# Patient Record
(demographics unavailable — no encounter records)

---

## 2025-02-12 NOTE — HISTORY OF PRESENT ILLNESS
[Former] : Former [TextBox_13] : Ms. ACOSTA is a 64 year old female with a history of bronchiectasis.  Reviewed and confirmed that the patient meets screening eligibility criteria:  64 years old   Smoking Status: Former smoker  Number of pack(s) per day: 1 Number of years smoked: 40 Number of pack years smokin Quit year:   No signs/symptoms of lung cancer  [YearQuit] : 2012 [PacksperDay] : 1 [N_Years] : 40 [PacksperYear] : 40

## 2025-05-29 NOTE — DISCUSSION/SUMMARY
[de-identified] : Discussed the nature of the diagnosis and risk and benefits of different modalities of treatment. RT ECU tendinitis  RT CUtS Discussed conservative management as symptoms demand vs CSI. She will splint & take MDP  Discussed that ulnar sided wrist pain can be somewhat difficult to diagnose, that although there are no signs of abutment, that this is something that will be considered if she fails to improve Rx provided She will apply warm compresses  She will avoid prolonged flexion at the elbow  RTO 4 weeks

## 2025-05-29 NOTE — PHYSICAL EXAM
[] : positive tinel's [Right] : right wrist [FreeTextEntry3] : Ulnar sided swelling  [de-identified] : ECU, mild TFC, Mild PT, Mild first CMC, Discomfort with forced supination & pronation  [FreeTextEntry1] : Ulnar sided hand swelling  [FreeTextEntry8] : Ulnar neutral variance with osteopenia

## 2025-05-29 NOTE — HISTORY OF PRESENT ILLNESS
[5] : 5 [10] : 10 [Localized] : localized [Radiating] : radiating [Shooting] : shooting [Tingling] : tingling [Constant] : constant [Sleep] : sleep [Meds] : meds [Part time] : Work status: part time [de-identified] : 64 Year old female with 8 months ulnar sided RIGHT wrist pain.  Denies trauma.  Is followed by rheumatologist for OA and fibromyalgia.  States rheum ordered xray.  She was referred.  SHe also states if she leans on her elbow, she will get a pinching feeling and tingling ulnar wrist [] : no [FreeTextEntry1] : right wrist [FreeTextEntry3] : ~ 1 year [FreeTextEntry5] : NKI. Patient is having pain and issues with the ulnar nerve. 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

## 2025-05-29 NOTE — DATA REVIEWED
[Outside X-rays] : outside x-rays [Right] : of the right [Wrist] : wrist [Hand] : hand [I independently reviewed and interpreted images and report] : I independently reviewed and interpreted images and report [I reviewed the films/CD and agree] : I reviewed the films/CD and agree

## 2025-06-06 NOTE — DISCUSSION/SUMMARY
[Obstructive Sleep Apnea] : obstructive sleep apnea [Moderate] : moderate in severity [Alcohol Avoidance] : alcohol avoidance [Sedative Avoidance] : sedative avoidance [Weight Loss Program] : weight loss program [Sleep Study] : sleep study [Patient] : discussed with the patient [de-identified] : Reevaluate with a new home sleep study [FreeTextEntry1] : Patient is complaints of dyspnea may be more on the basis of deconditioning of the regional muscles.  No evidence of intrinsic lung disease except for minimal degree of emphysema.  Pulmonary nodules are unchanged as well as minimal bronchiectasis and minimal emphysema.  Routine chemistries will be performed and the patient has been advised to follow-up with cardiology regarding aortic insufficiency

## 2025-06-06 NOTE — END OF VISIT
[Time Spent: ___ minutes] : I have spent [unfilled] minutes of time on the encounter which excludes teaching and separately reported services. [FreeTextEntry3] : CT reviewed on PACS Sleep study reviewed with patient

## 2025-06-06 NOTE — HISTORY OF PRESENT ILLNESS
[Former] : former [>= 20 pack years] : >= 20 pack years [Obstructive Sleep Apnea] : obstructive sleep apnea [Awakes Unrefreshed] : awakes unrefreshed [Daytime Somnolence] : daytime somnolence [Home] : home [APAP:] : APAP [TextBox_4] : 64-year-old female last seen by me in December 2023 with a 30-pack-year history of cigarette smoking DC'd in 2012, bronchiectasis, pulmonary nodules, thalassemia, aortic insufficiency, chronic anemia, fibromyalgia, restless leg syndrome, moderate obstructive sleep apnea seen today in follow-up.  Patient does have continued complaints of mild shortness of breath which she is much better able to quantify.  She walks on a regular basis with her brother who has underlying cardiac disease.  They walk approximately 4.6 miles several times per week and she also notes her heart rate.  She states that on level ambulation she has no complaints of difficulty, chest pains, palpitations, lightheadedness, dizziness.  On ambulating up a hill her heart rate will go from 120 to 150 bpm at which time she will rest and quickly recover.  No history of diaphoresis.  History is negative for cough wheeze or sputum [YearQuit] : 2012 [Awakes with Dry Mouth] : does not awaken with dry mouth [Awakes with Headache] : does not awaken with headache [Snoring] : no snoring [Witnessed Apneas] : no witnessed apneas [TextBox_100] : 9/28/2023 [TextBox_108] : 21 [TextBox_112] : 2.1 minutes [TextBox_116] : 83 [TextBox_125] : 4-16 [TextBox_158] : [place] [TextBox_162] : 12/2023 [TextBox_165] : Returned her CPAP machine before her first compliance evaluation [ESS] : 0

## 2025-06-06 NOTE — CONSULT LETTER
[Dear  ___] : Dear  [unfilled], [Consult Letter:] : I had the pleasure of evaluating your patient, [unfilled]. [Please see my note below.] : Please see my note below. [Consult Closing:] : Thank you very much for allowing me to participate in the care of this patient.  If you have any questions, please do not hesitate to contact me. [Sincerely,] : Sincerely, [DrAkua  ___] : Dr. ROY [FreeTextEntry3] : Duglas Lane MD FCCP Pulmonary/Critical Care/Sleep Medicine Department of Internal Medicine  [facility]

## 2025-07-03 NOTE — PROCEDURE
[Tendon Sheath] : tendon sheath [Right] : of the right [Pain] : pain [Inflammation] : inflammation [Alcohol] : alcohol [Ethyl Chloride sprayed topically] : ethyl chloride sprayed topically [Sterile technique used] : sterile technique used [___ cc    1%] : Lidocaine ~Vcc of 1%  [___ cc    10mg] : Triamcinolone (Kenalog) ~Vcc of 10 mg  [] : Patient tolerated procedure well [Risks, benefits, alternatives discussed / Verbal consent obtained] : the risks benefits, and alternatives have been discussed, and verbal consent was obtained

## 2025-07-11 NOTE — PHYSICAL EXAM
[] : positive tinel's [Right] : right hand [FreeTextEntry3] : Ulnar sided swelling  [de-identified] : ECU, mild TFC, Mild PT, Mild first CMC, Discomfort with forced supination & pronation

## 2025-07-11 NOTE — DISCUSSION/SUMMARY
[de-identified] : Discussed the nature of the diagnosis and risk and benefits of different modalities of treatment. RT ECU tendinitis  RT CUtS Discussed conservative management as symptoms demand vs CSI. She will apply warm compresses & take MDP  Rx provided  She will avoid prolonged flexion at the elbow  RTO 4 weeks

## 2025-07-11 NOTE — HISTORY OF PRESENT ILLNESS
[Localized] : localized [Radiating] : radiating [Shooting] : shooting [Constant] : constant [Sleep] : sleep [Meds] : meds [Part time] : Work status: part time [8] : 8 [9] : 9 [de-identified] : 64 Year old female followed for RT ECU tendinitis & RT CuTs. Pt has been applying warm compresses, avoiding prolonged flexion & taking MDPs. With what feels like a flare up  Is followed by rheumatologist for OA and fibromyalgia.  [] : no [FreeTextEntry1] : right wrist [FreeTextEntry3] : ~ 1 year [FreeTextEntry5] : NKI. Patient is having pain and issues with the ulnar nerve. 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

## 2025-07-11 NOTE — HISTORY OF PRESENT ILLNESS
[Localized] : localized [Radiating] : radiating [Shooting] : shooting [Constant] : constant [Sleep] : sleep [Meds] : meds [Part time] : Work status: part time [8] : 8 [9] : 9 [de-identified] : 64 Year old female followed for RT ECU tendinitis & RT CuTs. Pt has been applying warm compresses, avoiding prolonged flexion & taking MDPs. With what feels like a flare up  Is followed by rheumatologist for OA and fibromyalgia.  [] : no [FreeTextEntry1] : right wrist [FreeTextEntry3] : ~ 1 year [FreeTextEntry5] : NKI. Patient is having pain and issues with the ulnar nerve. 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

## 2025-07-11 NOTE — PHYSICAL EXAM
[] : positive tinel's [Right] : right hand [FreeTextEntry3] : Ulnar sided swelling  [de-identified] : ECU, mild TFC, Mild PT, Mild first CMC, Discomfort with forced supination & pronation

## 2025-07-11 NOTE — DISCUSSION/SUMMARY
[de-identified] : Discussed the nature of the diagnosis and risk and benefits of different modalities of treatment. RT ECU tendinitis  RT CUtS Discussed conservative management as symptoms demand vs CSI. She will apply warm compresses & take MDP  Rx provided  She will avoid prolonged flexion at the elbow  RTO 4 weeks